# Patient Record
Sex: MALE | Race: AMERICAN INDIAN OR ALASKA NATIVE
[De-identification: names, ages, dates, MRNs, and addresses within clinical notes are randomized per-mention and may not be internally consistent; named-entity substitution may affect disease eponyms.]

---

## 2018-02-20 ENCOUNTER — HOSPITAL ENCOUNTER (EMERGENCY)
Dept: HOSPITAL 5 - ED | Age: 33
Discharge: HOME | End: 2018-02-20
Payer: SELF-PAY

## 2018-02-20 VITALS — DIASTOLIC BLOOD PRESSURE: 113 MMHG | SYSTOLIC BLOOD PRESSURE: 164 MMHG

## 2018-02-20 DIAGNOSIS — S80.02XA: Primary | ICD-10-CM

## 2018-02-20 DIAGNOSIS — F12.10: ICD-10-CM
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
## 2018-02-20 DIAGNOSIS — F17.200: ICD-10-CM

## 2018-02-20 PROCEDURE — 99283 EMERGENCY DEPT VISIT LOW MDM: CPT

## 2018-02-20 NOTE — XRAY REPORT
LEFT KNEE, 3 views:



History: Left knee pain.



The bony architecture is intact without evidence of fracture or

dislocation.  No significant soft tissue abnormality is seen.



IMPRESSION:

Normal left knee.
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
